# Patient Record
(demographics unavailable — no encounter records)

---

## 2020-09-30 NOTE — NUR
FEMINA DE 41 ANOS,ALERTA,ORIENTADA EN MALACHI DEE DEE ESFERAS,DESCANSANDO EN CAMA CON
BARANDAS ELEVADAS,FRENOS,JOHNSON DE IDENTIFICACION COLOCADOS POR SEGURIDAD.
EVALUADA POR MD EN TURNO A QUIEN COLOCA ORDENES MEDICAS. 
ORIENTA PACIENTE SOBRE PROCEDIMIENTOS A LLEVAR A CABO,REFIERE COMPRENDER.
COLECTA MUESTRAS DE LABORATORIOS,ROTULA Y ENVIA PARA ANALISIS. NOTIFICA ESTUDIO
DE SONOGRAMA ABD A PERSONAL EN TURNO.